# Patient Record
Sex: MALE | URBAN - METROPOLITAN AREA
[De-identification: names, ages, dates, MRNs, and addresses within clinical notes are randomized per-mention and may not be internally consistent; named-entity substitution may affect disease eponyms.]

---
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
## 2023-05-11 RX ORDER — NAPROXEN SODIUM 220 MG
TABLET ORAL 2 TIMES DAILY WITH MEALS
COMMUNITY